# Patient Record
Sex: MALE | Race: WHITE | NOT HISPANIC OR LATINO | Employment: FULL TIME | URBAN - METROPOLITAN AREA
[De-identification: names, ages, dates, MRNs, and addresses within clinical notes are randomized per-mention and may not be internally consistent; named-entity substitution may affect disease eponyms.]

---

## 2023-12-18 ENCOUNTER — HOSPITAL ENCOUNTER (EMERGENCY)
Facility: MEDICAL CENTER | Age: 38
End: 2023-12-18

## 2023-12-18 VITALS
DIASTOLIC BLOOD PRESSURE: 84 MMHG | HEART RATE: 118 BPM | OXYGEN SATURATION: 97 % | TEMPERATURE: 97.8 F | RESPIRATION RATE: 18 BRPM | SYSTOLIC BLOOD PRESSURE: 139 MMHG

## 2024-01-01 ENCOUNTER — HOSPITAL ENCOUNTER (EMERGENCY)
Facility: MEDICAL CENTER | Age: 39
End: 2024-01-01
Attending: EMERGENCY MEDICINE
Payer: COMMERCIAL

## 2024-01-01 VITALS
WEIGHT: 164.24 LBS | OXYGEN SATURATION: 95 % | HEIGHT: 66 IN | SYSTOLIC BLOOD PRESSURE: 133 MMHG | RESPIRATION RATE: 19 BRPM | DIASTOLIC BLOOD PRESSURE: 74 MMHG | TEMPERATURE: 97.1 F | BODY MASS INDEX: 26.4 KG/M2 | HEART RATE: 114 BPM

## 2024-01-01 DIAGNOSIS — L02.91 ABSCESS: ICD-10-CM

## 2024-01-01 DIAGNOSIS — F15.10 METHAMPHETAMINE ABUSE (HCC): ICD-10-CM

## 2024-01-01 PROCEDURE — A9270 NON-COVERED ITEM OR SERVICE: HCPCS | Performed by: EMERGENCY MEDICINE

## 2024-01-01 PROCEDURE — 700101 HCHG RX REV CODE 250: Performed by: EMERGENCY MEDICINE

## 2024-01-01 PROCEDURE — 700102 HCHG RX REV CODE 250 W/ 637 OVERRIDE(OP): Performed by: EMERGENCY MEDICINE

## 2024-01-01 PROCEDURE — 99284 EMERGENCY DEPT VISIT MOD MDM: CPT

## 2024-01-01 PROCEDURE — 303977 HCHG I & D

## 2024-01-01 RX ORDER — DOXYCYCLINE 100 MG/1
100 CAPSULE ORAL 2 TIMES DAILY
Qty: 14 CAPSULE | Refills: 0 | Status: ACTIVE | OUTPATIENT
Start: 2024-01-01 | End: 2024-01-08

## 2024-01-01 RX ORDER — DOXYCYCLINE 100 MG/1
100 TABLET ORAL ONCE
Status: COMPLETED | OUTPATIENT
Start: 2024-01-01 | End: 2024-01-01

## 2024-01-01 RX ORDER — LIDOCAINE HCL/EPINEPHRINE/PF 2%-1:200K
10 VIAL (ML) INJECTION ONCE
Status: COMPLETED | OUTPATIENT
Start: 2024-01-01 | End: 2024-01-01

## 2024-01-01 RX ORDER — LIDOCAINE HYDROCHLORIDE AND EPINEPHRINE 10; 10 MG/ML; UG/ML
20 INJECTION, SOLUTION INFILTRATION; PERINEURAL ONCE
Status: DISCONTINUED | OUTPATIENT
Start: 2024-01-01 | End: 2024-01-01

## 2024-01-01 RX ORDER — IBUPROFEN 600 MG/1
600 TABLET ORAL ONCE
Status: COMPLETED | OUTPATIENT
Start: 2024-01-01 | End: 2024-01-01

## 2024-01-01 RX ORDER — ACETAMINOPHEN 325 MG/1
650 TABLET ORAL ONCE
Status: COMPLETED | OUTPATIENT
Start: 2024-01-01 | End: 2024-01-01

## 2024-01-01 RX ADMIN — LIDOCAINE HYDROCHLORIDE,EPINEPHRINE BITARTRATE 10 ML: 20; .005 INJECTION, SOLUTION EPIDURAL; INFILTRATION; INTRACAUDAL; PERINEURAL at 07:30

## 2024-01-01 RX ADMIN — DOXYCYCLINE 100 MG: 100 TABLET, FILM COATED ORAL at 07:06

## 2024-01-01 RX ADMIN — IBUPROFEN 600 MG: 600 TABLET, FILM COATED ORAL at 07:58

## 2024-01-01 RX ADMIN — ACETAMINOPHEN 650 MG: 325 TABLET, FILM COATED ORAL at 07:58

## 2024-01-01 ASSESSMENT — PAIN DESCRIPTION - PAIN TYPE: TYPE: ACUTE PAIN

## 2024-01-01 NOTE — DISCHARGE PLANNING
SW consulted for assistance with transportation; SW provided cab voucher to address provided by pt, listed on pts ID.

## 2024-01-01 NOTE — ED NOTES
Bedside report given to BERNIE Hanks    Oxygen:RA  Fall Risk status: N/A  Patient Mentation:A+O x 4  Continuous cardiac monitoring:N/A  Pending: draining abscess

## 2024-01-01 NOTE — DISCHARGE INSTRUCTIONS
Please take doxycycline for the next week and return if any significant change in symptoms    Please avoid methamphetamine  Pull packing in the shower in 2 days

## 2024-01-01 NOTE — ED NOTES
Medicated per MAR for buttock and low back pain. Discharge instructions and follow up care discussed with patient. Patient given time to ask questions, all questions addressed and patient verbalized understanding. Encouraged patient to return immediately for any concerning signs or symptoms. Patient given 1 new prescription and wound care supplies with relevant education. Cab voucher provided for pain with ambulation r/t location of abscesses. Cab called for pt. Patient AOx4 at discharge and ambulatory to lobby with steady gait.

## 2024-01-01 NOTE — ED NOTES
Assumed patient care, bedside report received from BERNIE Garner. Patient resting comfortably on gurney in NAD, repositioning self as needed. Respirations even and unlabored on RA. Continuous cardiac, pulse ox, and BP monitoring in place.     Fall precautions in place including but not limited to: call light within reach, bed locked and in the lowest position, floors are clean and dry, patient's personal possessions are within their safe reach, nonskid socks/shoes on patient, appropriate sign in place.      POC discussed with patient, all needs addressed at this time.

## 2024-01-01 NOTE — ED TRIAGE NOTES
Chief Complaint   Patient presents with    Wound Check     Pt has multiple red, raised areas on the dorsal aspect of right hand, palmar aspect of left hand, and one in the sacral area x3 days; pt attempted to drain the one on his right hand and says he had brown stuff come out     Pt ambulatory to triage for above complaint. Pt states he initially thought the one on his hand was a spider bite but then became concerned when he found the other ones.     Pt is alert/oriented and follows commands. Pt speaking in full sentences and responds appropriately to questions. No acute distress noted in triage and respirations are even and unlabored.     Pt placed in lobby and educated on triage process. Pt encouraged to alert staff for any changes in condition.

## 2024-01-01 NOTE — ED PROVIDER NOTES
"  ER Provider Note    Scribed for Johnathon Kohler M.d. by Marta Villalta. 1/1/2024  6:35 AM    Primary Care Provider: None noted    CHIEF COMPLAINT  Chief Complaint   Patient presents with    Wound Check     Pt has multiple red, raised areas on the dorsal aspect of right hand, palmar aspect of left hand, and one in the sacral area x3 days; pt attempted to drain the one on his right hand and says he had brown stuff come out     EXTERNAL RECORDS REVIEWED  Outpatient Notes Patient came to ED on 12/18/23 and left without being seen; otherwise no records available for review.     HPI/ROS  LIMITATION TO HISTORY   Select: : None  OUTSIDE HISTORIAN(S):  None    Juma Rivera is a 38 y.o. male who presents to the ED for a wound check. Patient states he initially thought one wound on his hand was a spider bite but then became concerned when he found other ones. Patient attempted to drain the wound with a needle, on his right hand and says he had brown stuff come out. He states this was the first wound.  Patient has multiple red, raised areas on the dorsal aspect of right hand, palmar aspect of left hand, and two in the sacral area which began 3-4 days ago. No alleviating or exacerbating factors noted. He reports a methamphetamine history, last endorses use in October. No known drug allergies.     PAST MEDICAL HISTORY  History reviewed. No pertinent past medical history.    SURGICAL HISTORY  History reviewed. No pertinent surgical history.    FAMILY HISTORY  History reviewed. No pertinent family history.    SOCIAL HISTORY   reports that he has been smoking cigarettes. He uses smokeless tobacco. He reports current alcohol use. He reports that he does not currently use drugs.    CURRENT MEDICATIONS  No current outpatient medications    ALLERGIES  Patient has no known allergies.    PHYSICAL EXAM  /86   Pulse (!) 116   Temp 36.3 °C (97.3 °F) (Temporal)   Resp 16   Ht 1.676 m (5' 6\")   Wt 74.5 kg (164 lb 3.9 oz)   " SpO2 97%   BMI 26.51 kg/m²   Constitutional: Well developed, Well nourished, No acute distress, Non-toxic appearance.   HENT: Normocephalic, Atraumatic, Bilateral external ears normal, Oropharynx is clear mucous membranes are moist. No oral exudates or nasal discharge.   Eyes: Pupils are equal round and reactive, EOMI, Conjunctiva normal, No discharge.   Neck: Normal range of motion, No tenderness, Supple, No stridor. No meningismus.  Lymphatic: No lymphadenopathy noted.   Cardiovascular: Regular rate and rhythm without murmur rub or gallop.  Thorax & Lungs: Clear breath sounds bilaterally without wheezes, rhonchi or rales. There is no chest wall tenderness.   Abdomen: Soft non-tender non-distended. There is no rebound or guarding. No organomegaly is appreciated. Bowel sounds are normal.  Skin: Normal without rash. Dorsum of right hand; mild swelling popped by himself. No redness. Left palm hand, central tender nodule that feels like a deeper abscess. Left buttocks he has two abscesses  at about 8 cm near gluteal fold   Back: No CVA or spinal tenderness.   Extremities: Intact distal pulses, No edema, No tenderness, No cyanosis, No clubbing. Capillary refill is less than 2 seconds.  Musculoskeletal: Good range of motion in all major joints. No tenderness to palpation or major deformities noted.   Neurologic: Alert & oriented x 3, Normal motor function, Normal sensory function, No focal deficits noted. Reflexes are normal.  Psychiatric: Affect normal, Judgment normal, Mood normal. There is no suicidal ideation or patient reported hallucinations.     PROCEDURES:  Incision and Drainage Procedure Note    Indication: Left buttocks near gluteal fold 2 cm abscess                                                   Procedure: The patient was positioned appropriately and the skin over the incision site was draped in a sterile fashion. Local anesthesia was obtained by infiltration using 2% Lidocaine with epinephrine.   An incision was then made over the apex of the lesion and approximately 1 cc of thick material was expressed. Loculations were broken up using sterile q-tip. The drainage cavity was then packed with sterile gauze. The patient’s tetanus status was up to date and did not require a booster dose.    The patient tolerated the procedure well.    Complications: None    Abscesses  at about 8 cm     Incision and Drainage Procedure Note    Indication: Left buttocks near gluteal fold 2 cm abscess.     Procedure: The patient was positioned appropriately and the skin over the incision site was draped in a sterile fashion. Local anesthesia was obtained by infiltration using 2% Lidocaine with epinephrine.  An incision was then made over the apex of the lesion and approximately 1 cc of thick material was expressed. Loculations were broken up using sterile q-tip. The drainage cavity was then packed with sterile gauze. The patient’s tetanus status was up to date and did not require a booster dose.    The patient tolerated the procedure well.    Complications: None    COURSE & MEDICAL DECISION MAKING   ED Observation Status? No; the patient does not meet criteria for ED observation at this time.    INITIAL ASSESSMENT, COURSE AND PLAN  Care Narrative:   6:37 AM - Patient was seen and evaluated at bedside. Patient presents to the ED for a wound check. On exam patient's dorsum of right hand; mild swelling popped by himself. No redness. Left palm hand, central tender nodule that feels like a deeper abscess. Left buttocks near gluteal fold another 2 cm abscess. After my exam, I discussed with the patient the plan of care, which includes doing incision and drainage on his abscesses. Patient understands and verbalizes agreement to plan of care. Patient will be treated with Adoxa 100 mg. Ordered lidocaine-epinephrine 2% 20 mL for incision and drainage.      7:15 AM - Numbed abscesses with lidocaine-epinephrine 2%.     7:31 AM -  Incision and drainage done by me at this time; see notes above for details. I then informed the patient of my plan for discharge, which includes strict return precautions for any new or worsening symptoms. Patient understands and verbalizes agreement to plan of care. Patient is comfortable going home at this time.     ADDITIONAL PROBLEM LIST  amphetamine abuse.  Asked the patient to stop using this drug and he denies IV use    DISPOSITION AND DISCUSSIONS  I have discussed management of the patient with the following physicians and HILARY's:  None    Discussion of management with other QHP or appropriate source(s): None     Barriers to care at this time, including but not limited to:  None .     Decision tools and prescription drugs considered including, but not limited to: Antibiotics doxycycline .    The patient will return for new or worsening symptoms and is stable at the time of discharge.    DISPOSITION:  Patient will be discharged home in stable condition.    OUTPATIENT MEDICATIONS:  New Prescriptions    DOXYCYCLINE (MONODOX) 100 MG CAPSULE    Take 1 Capsule by mouth 2 times a day for 7 days.     FINAL DIAGNOSIS  1. Abscess    2. Methamphetamine abuse ([facility])    3.  Incision and drainage of left buttocks abscess by ERP, upper  4.  Incision and drainage of left buttocks abscess by ERP, lower  The note accurately reflects work and decisions made by me.  Johnathon Kohler M.D.  1/1/2024  7:54 AM

## 2024-01-01 NOTE — ED NOTES
2 gluteal abscesses lanced and packed by ERP at bedside with assist of this RN. Pt tolerated well.

## 2025-08-08 ENCOUNTER — HOSPITAL ENCOUNTER (EMERGENCY)
Facility: MEDICAL CENTER | Age: 40
End: 2025-08-08
Attending: STUDENT IN AN ORGANIZED HEALTH CARE EDUCATION/TRAINING PROGRAM
Payer: COMMERCIAL

## 2025-08-08 VITALS
HEIGHT: 66 IN | RESPIRATION RATE: 16 BRPM | SYSTOLIC BLOOD PRESSURE: 127 MMHG | TEMPERATURE: 96.7 F | HEART RATE: 86 BPM | DIASTOLIC BLOOD PRESSURE: 95 MMHG | OXYGEN SATURATION: 96 % | BODY MASS INDEX: 24.59 KG/M2 | WEIGHT: 153 LBS

## 2025-08-08 DIAGNOSIS — R11.2 NAUSEA AND VOMITING, UNSPECIFIED VOMITING TYPE: Primary | ICD-10-CM

## 2025-08-08 DIAGNOSIS — R19.7 DIARRHEA OF PRESUMED INFECTIOUS ORIGIN: ICD-10-CM

## 2025-08-08 PROCEDURE — A9270 NON-COVERED ITEM OR SERVICE: HCPCS | Performed by: STUDENT IN AN ORGANIZED HEALTH CARE EDUCATION/TRAINING PROGRAM

## 2025-08-08 PROCEDURE — 700111 HCHG RX REV CODE 636 W/ 250 OVERRIDE (IP): Performed by: STUDENT IN AN ORGANIZED HEALTH CARE EDUCATION/TRAINING PROGRAM

## 2025-08-08 PROCEDURE — 700102 HCHG RX REV CODE 250 W/ 637 OVERRIDE(OP): Performed by: STUDENT IN AN ORGANIZED HEALTH CARE EDUCATION/TRAINING PROGRAM

## 2025-08-08 PROCEDURE — 99284 EMERGENCY DEPT VISIT MOD MDM: CPT

## 2025-08-08 RX ORDER — ONDANSETRON 4 MG/1
4 TABLET, ORALLY DISINTEGRATING ORAL ONCE
Status: COMPLETED | OUTPATIENT
Start: 2025-08-08 | End: 2025-08-08

## 2025-08-08 RX ORDER — LOPERAMIDE HYDROCHLORIDE 2 MG/1
2 CAPSULE ORAL ONCE
Status: COMPLETED | OUTPATIENT
Start: 2025-08-08 | End: 2025-08-08

## 2025-08-08 RX ORDER — ONDANSETRON 4 MG/1
4 TABLET, ORALLY DISINTEGRATING ORAL EVERY 6 HOURS PRN
Qty: 10 TABLET | Refills: 0 | Status: SHIPPED | OUTPATIENT
Start: 2025-08-08

## 2025-08-08 RX ORDER — LOPERAMIDE HYDROCHLORIDE 2 MG/1
2 CAPSULE ORAL 4 TIMES DAILY PRN
Qty: 30 CAPSULE | Refills: 0 | Status: SHIPPED | OUTPATIENT
Start: 2025-08-08

## 2025-08-08 RX ADMIN — ONDANSETRON 4 MG: 4 TABLET, ORALLY DISINTEGRATING ORAL at 02:51

## 2025-08-08 RX ADMIN — LOPERAMIDE HYDROCHLORIDE 2 MG: 2 CAPSULE ORAL at 02:51
